# Patient Record
(demographics unavailable — no encounter records)

---

## 2025-02-28 NOTE — HISTORY OF PRESENT ILLNESS
[FreeTextEntry1] :  The patient comes in for a routine follow-up pulmonary evaluation. [de-identified] : The patient is feeling well from a pulmonary standpoint. The patient has a history of COPD for which he is maintained on Breo 200-25 MCG/INH once daily. He admits that he does not use his inhaler on a daily basis. There has been no cough, sputum production, hemoptysis, or wheeze. Unfortunately, he is smoking between 1 to 2 packs of cigarettes per day. He has slight shortness of breath with exertional type activities but reports that it has been stable. He does occasionally walk. He has lost approximately 20 lbs overall.   The patient also has underlying obstructive sleep apnea. He is not compliant with his CPAP mask. He denies any fatigue. He states that he does not take naps.  Lastly, the patient is followed by the [employer] [facility]. He was exposed on 9/11. He is in the screening program. He undergoes yearly routine low-dose CAT scans of the chest. He reports that he did have a recent chest CT, potentially in October/November 2024. There have been no fevers, chills, or night sweats. There has been no chest pain, palpitations, or PND. There have been no other acute constitutional symptoms. He comes in for this assessment.

## 2025-02-28 NOTE — ADDENDUM
[FreeTextEntry1] : This note was written by Claudia Cordon on 02/28/2025 acting as a medical scribe for Dr. Osmani Richards MD. All medical entries made by the scribe were at my, Dr. Osmani Richards's, direction and personally dictated by me on 02/28/2025. I have reviewed the chart and agree that the record accurately reflects my personal performance of the history, review of systems, assessment, and plan. I have also personally directed, reviewed, and agreed with the chart.

## 2025-02-28 NOTE — PHYSICAL EXAM
[Normal Sclera/Conjunctiva] : normal sclera/conjunctiva [EOMI] : extraocular movements intact [No JVD] : no jugular venous distention [Supple] : supple [Clear to Auscultation] : lungs were clear to auscultation bilaterally [Normal Rate] : normal rate  [Regular Rhythm] : with a regular rhythm [Normal S1, S2] : normal S1 and S2 [No Edema] : there was no peripheral edema [No Extremity Clubbing/Cyanosis] : no extremity clubbing/cyanosis [Soft] : abdomen soft [Normal Bowel Sounds] : normal bowel sounds [Normal Posterior Cervical Nodes] : no posterior cervical lymphadenopathy [Normal Anterior Cervical Nodes] : no anterior cervical lymphadenopathy [Coordination Grossly Intact] : coordination grossly intact [Normal Gait] : normal gait [Normal Affect] : the affect was normal [Normal Insight/Judgement] : insight and judgment were intact [de-identified] : Obese white male [de-identified] : The pharynx is narrowed and crowded posteriorly [de-identified] : There is fair to good air entry bilaterally.

## 2025-02-28 NOTE — PLAN
[FreeTextEntry1] : 1. Continue current medications as outlined above.   2. It has been urged that the patient be more compliant with his Breo 200-25 MCG/INH inhaler.    3. Follow up in 6 months with PFT   4. Continue to follow with physicians at the [facility]   5. Routine medical follow-up with his primary care physician, Dr. Edmonds.  6. Increase cardiovascular exercise as tolerated. The medical concerns associated with obesity were reviewed in detail, and the importance of weight reduction was discussed. Various options were presented. The patient will consider these options and look to integrate increased activity/exercise as tolerated into their schedule.  7. We had a detailed discussion lasting 4 minutes regarding smoking cessation. He is not interested in quitting at this time.

## 2025-02-28 NOTE — DATA REVIEWED
[FreeTextEntry1] : Spirometric analysis reveals a mild degree of restriction.  An underlying obstructive process cannot be entirely ruled out.  Bronchodilator reactivity is not demonstrated at this time.

## 2025-05-08 NOTE — PHYSICAL EXAM
[Positive] : positive Felecia [NL (0)] : extension 0 degrees [5___] : hamstring 5[unfilled]/5 [] : patient ambulates without assistive device [Right] : right knee [Left] : left knee [AP] : anteroposterior [Lateral] : lateral [[place]] : skyline [AP Standing] : anteroposterior standing [FreeTextEntry9] : Maintained joint spaces, no fractures noted [TWNoteComboBox7] : flexion 115 degrees

## 2025-05-08 NOTE — HISTORY OF PRESENT ILLNESS
[de-identified] : Date of Injury/Onset:1/12/25 Mechanism of injury: NKI Have you been treated for this in the past?Y Have you had surgery for this in the past?N Physical Therapy/ HEP: CURRENTLY IN OPPT OTC Medicines:ALEVE RX medicines: Heat, Ice, Elevation: ICE CSI or Gel Injections:  None Other Previous Treatment: KINESIOLOGY TAPING Prior Imaging/Studies: HAD MRI'S KNEES AND HIPS Z/P   Pain: At Rest: 7/10 With Activity: 7/10 Quality of symptoms: C/O BILATERAL ANTERIOR KNEE PAIN, LEFT IS WORSE, INSTABILTY, NO SWELLING, NO THIGH PAIN   Affecting Sleep: NO Stiffness upon waking, lasting greater than 30mins: Yes Difficulty with stairs: Yes Difficulty getting in and out of car: Yes Sit to stand stiffness: Yes Affects walking short/long distances? Yes Home/Work/Recreation affected? Yes   Improves with: Worse with: WALKING/STANDING/SIT TO STAND   This is not a Work-Related Injury being treated under Worker's Compensation. This is not an athletic injury occurring associated with an interscholastic or organized sports team.

## 2025-05-08 NOTE — DISCUSSION/SUMMARY
[de-identified] : Diagnosis: Meniscus Tear   GERALDINE MCDERMOTT 53 year  M was seen and evaluated in the office today. Following evaluation, and history of the patient's condition at length, the pathology was explained in full to the patient in layman's terms. Explained to the patient that based on physical exam and imaging review, they likely have Medial/lateral meniscal tear. Discussed with patient that due to the fragile blood supply of the meniscus and the nature of the condition, it is unlikely that the tear will heal on its own, and they will likely experience constant/episodic pain in the knee limiting ADLs. Discussed with patients that due to this meniscal tear, they are at increased risk of developing knee osteoarthritis and therefore ultimately requiring a total knee arthroplasty in their lifetime.   In the interim, discussed with patient several different treatment options regarding managing the gradual loss of function associated with meniscal tears and the progression to OA along with specific risks and benefits. Nonsurgical options including but not limited to Corticosteroid Injection, Meloxicam 15mg, Medrol Dose Pack, along with activity modification such as non-impact exercise and organized physical therapy. The risk of morbidity associated with proposed treatments were discussed.   Furthermore, discussed with GERALDINE that they could also delay any immediate treatment options and continue to observe and self-care for the discussed problem. Discussed Home Exercise Programs as well as Rest, Ice and elevation. Patient had ample time to ask any questions about todays visit and the diagnosis, and all questions were answered. Patient verbally expressed they understand the discussion today and the plan going forward.  Indication: The proposed LEFT Knee arthroscopy is indicated due to meniscal tear that causes patient increased and recurrent pain throughout the day. Patient reports continued discomfort with non-satisfactory response to non-surgical modalities.    Expected Recovery & Rehabilitation: The patient was informed of the expected time course for return of function and pain relief. We discussed the importance of compliance with postoperative instructions to facilitate optimal recovery and minimize complications. Emphasis was placed on active participation in rehabilitation and its impact on both short- and long-term outcomes. The discharge plan includes physical therapy, provided it is appropriate and covered by the patients insurance. The patient was encouraged to arrange for assistance at home following surgery.   Outcomes & Expectations: We reviewed the expected surgical outcomes, the likelihood of satisfaction after full recovery, and potential causes of dissatisfaction, including the possibility of recurrent pain, lack of improvement, or worsening pain.   Risks & Complications: The patient was informed in detail about the risks associated with total knee replacement, including but not limited to: Surgical risks: Infection, wound complications, tendon or ligament injury, nerve injury, vascular injury, hemorrhage, stiffness, instability, persistent pain, need for reoperation. Perioperative medical risks: Deep vein thrombosis (DVT), pulmonary embolism, heart attack, stroke, cardiopulmonary complications, and other risks related to medical comorbidities, including elevated body mass index (BMI). Anesthesia risks: Potential complications related to anesthesia, including death, were also discussed. Though I defer to the anesthesia team to discuss complications of anesthesia procedures. To mitigate these risks, we will use sterile technique, perioperative antibiotics, and DVT prophylaxis when appropriate. The patient will be monitored postoperatively in the office setting to track recovery progress.   Preoperative Medical Clearance: The patient was advised of the need for a preoperative medical risk evaluation by their primary care physician (PCP). Additional subspecialty clearances, such as cardiac evaluation, may be required based on the PCPs assessment.   Informed Consent & Next Steps: The risks, benefits, and alternatives to surgery were discussed at length. The patient actively participated in the discussion, had their questions answered, and agreed to proceed with elective TKR. They were instructed to coordinate with my surgical team for scheduling, preoperative testing, and medical optimization.

## 2025-05-08 NOTE — DATA REVIEWED
[Right] : of the right [MRI] : MRI [Left] : left [Knee] : knee [I independently reviewed and interpreted images and report] : I independently reviewed and interpreted images and report [FreeTextEntry1] : IMPRESSION:  1. Probable degenerative type tear in the posterior horn of the medial meniscus as described. 2. Mild chondromalacia in the inferomedial trochlear cartilage as described. No measurable cartilage defect. [FreeTextEntry2] : IMPRESSION:  1. Small slightly complex but predominantly oblique tear along the anterior margin of the posterior horn of the lateral meniscus. 2. Degenerative appearing signal in the posterior horn of the medial meniscus, but no tear demonstrated. 3. Mild chondromalacia patella and chondromalacia in the posterolateral tibial plateau as described. No measurable cartilage defect. 4. 6-7 mm loose body in the posterior joint space.

## 2025-06-24 NOTE — PHYSICAL EXAM
[No Acute Distress] : no acute distress [Well Nourished] : well nourished [Well Developed] : well developed [Normal Oropharynx] : normal oropharynx [II] : Mallampati Class: II [Normal Appearance] : normal appearance [Supple] : supple [No Neck Mass] : no neck mass [Normal Rate/Rhythm] : normal rate/rhythm [Normal S1, S2] : normal s1, s2 [No Murmurs] : no murmurs [No Resp Distress] : no resp distress [No Acc Muscle Use] : no acc muscle use [Clear to Auscultation Bilaterally] : clear to auscultation bilaterally [No Abnormalities] : no abnormalities [Normal Gait] : normal gait [No Clubbing] : no clubbing [No Cyanosis] : no cyanosis [No Edema] : no edema [Normal Color/ Pigmentation] : normal color/ pigmentation [No Rash] : no rash [Normal Turgor] : normal turgor [No Focal Deficits] : no focal deficits [No Sensory Deficits] : no sensory deficits [Oriented x3] : oriented x3 [Normal Mood] : normal mood [Normal Affect] : normal affect

## 2025-06-24 NOTE — REASON FOR VISIT
[Follow-Up] : a follow-up visit [Pre-op Risk Stratification] : pre-op risk stratification [COPD] : COPD [Nicotine Dependence] : nicotine dependence

## 2025-06-25 NOTE — COUNSELING
[Cessation strategies including cessation program discussed] : Cessation strategies including cessation program discussed [Use of nicotine replacement therapies and other medications discussed] : Use of nicotine replacement therapies and other medications discussed [Encouraged to pick a quit date and identify support needed to quit] : Encouraged to pick a quit date and identify support needed to quit [Smoking Cessation Program Referral] : Smoking Cessation Program Referral  [Yes] : Willing to quit smoking [Potential consequences of obesity discussed] : Potential consequences of obesity discussed [Benefits of weight loss discussed] : Benefits of weight loss discussed [Encouraged to maintain food diary] : Encouraged to maintain food diary [Encouraged to increase physical activity] : Encouraged to increase physical activity [Encouraged to use exercise tracking device] : Encouraged to use exercise tracking device [FreeTextEntry1] : 5

## 2025-06-25 NOTE — REVIEW OF SYSTEMS
[Fatigue] : fatigue [Cough] : cough [Negative] : Endocrine [Fever] : no fever [Recent Wt Gain (___ Lbs)] : ~T no recent weight gain [Chills] : no chills [Poor Appetite] : no poor appetite [Recent Wt Loss (___ Lbs)] : ~T no recent weight loss [Dry Eyes] : no dry eyes [Ear Disturbance] : no ear disturbance [Eye Irritation] : no eye irritation [Nasal Congestion] : no nasal congestion [Sinus Problems] : no sinus problems [Mouth Ulcers] : no mouth ulcers [Hemoptysis] : no hemoptysis [Chest Tightness] : no chest tightness [Frequent URIs] : no frequent URIs [Sputum] : no sputum [Dyspnea] : no dyspnea [Pleuritic Pain] : no pleuritic pain [Wheezing] : no wheezing [A.M. Dry Mouth] : no a.m. dry mouth [SOB on Exertion] : no sob on exertion

## 2025-06-25 NOTE — HISTORY OF PRESENT ILLNESS
[Current] : current [Never] : never [TextBox_4] : Chief complaint preoperative pulmonary optimization prior to undergoing left arthroscopic surgery. History of COPD. Current tobacco dependence. [place] [employer] [place] exposure.  History of present illness. 53 years old pleasant gentleman morbidly obese with history of COPD had planned left arthroscopic surgery. Patient came for preoperative pulmonary optimization prior to undergoing surgery. He has history of COPD currently on Breo Ellipta No recent acute exacerbation of COPD. Not on oxygen supplementation. Chronic daily cough with minimal sputum production. No prior intubations for COPD. 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  No fever or chills. No recent upper or lower respiratory tract infection. No recent sputum production No recent use of oral steroids or antibiotics

## 2025-06-25 NOTE — PROCEDURE
[FreeTextEntry1] : Spirometry results are nonspecific. There is no bronchodilator response. There is mild reduction in DLCO. There is no reduced total lung capacity. These numbers are slightly downtrending

## 2025-06-25 NOTE — HISTORY OF PRESENT ILLNESS
[Current] : current [Never] : never [TextBox_4] : Chief complaint preoperative pulmonary optimization prior to undergoing left arthroscopic surgery. History of COPD. Current tobacco dependence. [place] [employer] [place] explosion exposure.  History of present illness. 53 years old pleasant gentleman morbidly obese with history of COPD had planned left arthroscopic surgery. Patient came for preoperative pulmonary optimization prior to undergoing surgery. He has history of COPD currently on Breo Ellipta No recent acute exacerbation of COPD. Not on oxygen supplementation. Chronic daily cough with minimal sputum production. No prior intubations for COPD. [employer] commission 9/11.  No fever or chills. No recent upper or lower respiratory tract infection. No recent sputum production No recent use of oral steroids or antibiotics

## 2025-06-25 NOTE — ASSESSMENT
[FreeTextEntry1] : 53 years old, obese gentleman former  exposed to [employer] attack has history of COPD and is planned for orthoscopic surgery of surgery of his knee. Referred to me for preoperative pulmonary evaluation prior to undergoing procedure. I do not have a new chest x-ray for the patient. I have reviewed his PFTs that shows nonspecific spirometry mild reduction in DLCO and normal lung volumes. Patient is a current smoker he is currently on Breo Ellipta but denies any acute exacerbation of COPD.  Problems. Preoperative pulm evaluation prior to undergoing arthroscopic surgery. History of chronic bronchitis. Mild reduction in DLCO, decrease in FEV1, -likely related to early emphysema  Recommendations. Continue Breo Ellipta. Patient needs a chest x-ray prior to finalizing the recommendations for preoperative pulm evaluation.  If chest x-ray is normal-patient is at low risk of perioperative pulmonary complications for arthroscopic surgery under general anesthesia or moderate sedation.  I have given him oral steroid for 3 days to be used prior to surgery. Incentive spirometry, aspiration precautions, DVT prophylaxis, early ambulation as per postoperative care guidelines.  Smoking cessation counseling was given. For long-term management-patient will follow-up with Dr. LEA   Chest x-ray reviewed. No further pulmonary testing is required prior to undergoing orthopedic surgery under general anesthesia. Patient is optimized to proceed for surgery. Usual pulmonary perioperative care includes. Incentive spirometry. DVT prophylaxis. Head of bed elevation. Aspiration precautions. Early ambulation and adequate pain relief

## 2025-07-18 NOTE — PHYSICAL EXAM
[Left] : left knee [5___] : hamstring 5[unfilled]/5 [] : non-antalgic [TWNoteComboBox7] : flexion 110 degrees [de-identified] : extension 0 degrees

## 2025-07-18 NOTE — HISTORY OF PRESENT ILLNESS
[de-identified] : LT KNEE SCOPE 7/7/25.  Patient denies fevers, Chills, SOB. Patient has been doing well, has been doing HEP. Patient reports icing and using lidocaine patches as needed.

## 2025-07-18 NOTE — DISCUSSION/SUMMARY
[de-identified] : Patient doing well overall. Dressing removed in office today; incision C/D/I.  Patient can begin formal PT.   Discussed importance of non-impact exercise and muscle stretching before and after exercise. Explained the importance of ice and rest.  Pt is doing well and is to continue with treatment plan. Pt was shown exercises and stretches that can help increase  ROM and strength.  Patient will begin OPPT at this time, given Rx for this.  F/u in 4 weeks